# Patient Record
(demographics unavailable — no encounter records)

---

## 2025-06-03 NOTE — HISTORY OF PRESENT ILLNESS
[FreeTextEntry1] : 37 yo  with nexplanon in situ since 2023 presenting c/o 3 months of mood changes and 3 weeks of blurry vision. I explained to Aline that the implant is unlikely causing these changes and hormonal changes are usually paired with onset of use. We can take out today, levels drop to normal in 1 week afterwhich symptoms should resolve if they are secondary to the implant. Advised, I do not believe it is the implant and if symptoms do not subside she should follow up. She reports being in good health with no new stressors.  She does not wish to be pregnant at this time. All LARC/SARC methods reviewed in detail. She is going to take more time to consider her options. Aware of immediate return to fertility.  Contraceptive Counseling   All forms of reversible contraception including combined hormonal contraception, progestin-only contraceptives, IUDs, and implants were reviewed with the patient.  The risks, benefits, and alternatives were discussed.   CHC including pill, patch, and ring, was discussed with the patient. Common side-effects of CHC were reviewed.  Typical effectiveness rates of 93% were reviewed.   The patient as instructed in the correct use of combined methods. The patient was also counseled about the reduced contraceptive effectiveness if doses are missed and the recommendation for condom use/back up method/abstinence for 1 week after.  The patient was counseled on the risk of blood clot and stroke, but that the risk of stroke from pregnancy outweighs that from CHC.  The patient denies history of blood clot/hypertension/CVA/migraine with aura/breast cancer/liver disease/gallbladder disease/family history of first degree relative with DVT/CVA.   Reviewed the option of continuous CHC and that breakthrough bleeding may occur with a continuous regimen.   Progestin-only contraceptives including progestin-only pills, DMPA, the subdermal implant and the hormonal IUDs were discussed with the patient.  The patient was counseled about the risks and benefits of POP's, DMPA, implant, and hormonal IUD contraception. Common side-effects of progestin-only contraceptives including changes in bleeding patterns were reviewed.   Non-hormonal methods such as pH altering vaginal suppositories, condoms, spermicide and copper iud were reviewed.

## 2025-06-03 NOTE — PHYSICAL EXAM
[Chaperoned Physical Exam] : A chaperone was present in the examining room during all aspects of the physical examination. 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

## 2025-06-03 NOTE — REASON FOR VISIT
[Language Line ] : provided by [employer]   [Time Spent: ____ minutes] : Total time spent using  services: [unfilled] minutes. The patient's primary language is not English thus required  services. [Interpreters_IDNumber] : [id number]

## 2025-06-03 NOTE — PLAN
[FreeTextEntry1] : 35 yo with mood changes and blurry vision, pt attributes to implant now s/p removal. Advised to follow up if symptoms persist  Aware it is unlikely the implant Aware of rapid return to fertility Will f/u if desires birth control   I spent a total of 20 minutes on the encounter evaluating and treating the patient.  Total time does not include the time spent on separately billable procedures performed on this DOS.